# Patient Record
Sex: FEMALE | Race: WHITE | NOT HISPANIC OR LATINO | URBAN - METROPOLITAN AREA
[De-identification: names, ages, dates, MRNs, and addresses within clinical notes are randomized per-mention and may not be internally consistent; named-entity substitution may affect disease eponyms.]

---

## 2017-02-23 ENCOUNTER — IMPORTED ENCOUNTER (OUTPATIENT)
Dept: URBAN - METROPOLITAN AREA CLINIC 1 | Facility: CLINIC | Age: 82
End: 2017-02-23

## 2017-02-23 PROBLEM — H01.004: Noted: 2017-02-23

## 2017-02-23 PROBLEM — H01.001: Noted: 2017-02-23

## 2017-02-23 PROBLEM — H40.1132: Noted: 2017-02-23

## 2017-02-23 PROCEDURE — 92083 EXTENDED VISUAL FIELD XM: CPT

## 2017-02-23 PROCEDURE — 92012 INTRM OPH EXAM EST PATIENT: CPT

## 2017-02-23 NOTE — PATIENT DISCUSSION
1.  Moderate Open Angle Glaucoma OU (0.55/0.7)- VF shows no significant progression. Stable IOP OU. Patient to continue with Lumgian OU QHS. Consider adding gtt with any future progression. Continue brand Lumigan. Patient advised to be compliant with gtts. 2.  Blepharitis anterior type OU improved- Continue Daily warm compresses BID and lid scrubs QD were recommended. 3. ZEINAB w/ PEK OU- Cont ATs Q2hrs OU w/a 4. PCO OS: Observe 5. Pseudophakia OU- (Standard) 6. Amiodarone Keratopathy OU- Observe Return for an appointment in 6 mo 30 OCT with Dr. Hiram Lucas.

## 2017-08-31 ENCOUNTER — IMPORTED ENCOUNTER (OUTPATIENT)
Dept: URBAN - METROPOLITAN AREA CLINIC 1 | Facility: CLINIC | Age: 82
End: 2017-08-31

## 2017-08-31 PROBLEM — H40.1132: Noted: 2017-08-31

## 2017-08-31 PROBLEM — H26.492: Noted: 2017-08-31

## 2017-08-31 PROBLEM — H04.123: Noted: 2017-08-31

## 2017-08-31 PROBLEM — H01.005: Noted: 2017-08-31

## 2017-08-31 PROBLEM — Z96.1: Noted: 2017-08-31

## 2017-08-31 PROBLEM — H16.143: Noted: 2017-08-31

## 2017-08-31 PROBLEM — H01.002: Noted: 2017-08-31

## 2017-08-31 PROCEDURE — 92133 CPTRZD OPH DX IMG PST SGM ON: CPT

## 2017-08-31 PROCEDURE — 92014 COMPRE OPH EXAM EST PT 1/>: CPT

## 2017-08-31 NOTE — PATIENT DISCUSSION
1.  Moderate Open Angle Glaucoma OU (0.6/0.75)- Stable IOP OU. Increased C/D OU. Patient to continue with brnaded Lumigan OU QHS. Patient advised to be compliant with gtts. Condition was discussed with patient and patient understands. Will continue to monitor patient for any progression in condition. Patient was advised to call us with any problems questions or concerns. 2.  ZEINAB w/ PEK OU- Stable. The continuation of artificial tears were recommended. 3.  PCO  OS: Observe and consider yag cap when pt feels pco visually significant and visual acuity decreases to appropriate level. 4. Blepharitis anterior type OU- Stable. Daily warm compresses and lid scrubs were recommended. 5. Pseudophakia OU- Doing well. 6.  Patient defers the refraction at today's visit    7. Return for an appointment for a 10/HVF in 6 months with Dr. Lila Sanchez.

## 2018-03-05 ENCOUNTER — IMPORTED ENCOUNTER (OUTPATIENT)
Dept: URBAN - METROPOLITAN AREA CLINIC 1 | Facility: CLINIC | Age: 83
End: 2018-03-05

## 2018-03-05 PROBLEM — H40.1132: Noted: 2018-03-05

## 2018-03-05 PROCEDURE — 92012 INTRM OPH EXAM EST PATIENT: CPT

## 2018-03-05 PROCEDURE — 92083 EXTENDED VISUAL FIELD XM: CPT

## 2018-03-05 NOTE — PATIENT DISCUSSION
1.  Moderate Open Angle Glaucoma OU (0.6/0.75)- Stable IOP OU. VF shows no progression OU. Continue brand only Lumigan OU QHS. Patient advised to be compliant with gtts. 2.  ZEINAB w/ PEK OU- Cont ATs TID OU Routinely. 3.  PCO OS: Observe  4. Anterior Blepharitis OU OU- Cont Daily warm compresses and lid scrubs were recommended. 5. Pseudophakia OU Return for an appointment in 6 mo 27 OCT with Dr. Lucila Somers.

## 2018-09-10 ENCOUNTER — IMPORTED ENCOUNTER (OUTPATIENT)
Dept: URBAN - METROPOLITAN AREA CLINIC 1 | Facility: CLINIC | Age: 83
End: 2018-09-10

## 2018-09-10 PROBLEM — H40.1132: Noted: 2018-09-10

## 2018-09-10 PROBLEM — H04.123: Noted: 2018-09-10

## 2018-09-10 PROBLEM — H16.143: Noted: 2018-09-10

## 2018-09-10 PROCEDURE — 92133 CPTRZD OPH DX IMG PST SGM ON: CPT

## 2018-09-10 PROCEDURE — 92014 COMPRE OPH EXAM EST PT 1/>: CPT

## 2018-09-10 NOTE — PATIENT DISCUSSION
1.  Moderate Open Angle Glaucoma OU (0.6/0.75)- Stable IOP OU. OCT shows no progression OU. Continue brand only Lumigan OU QHS. Patient advised to be compliant with gtts. 2.  ZEINAB w/ PEK OU- Cont ATs TID OU Routinely. 3.  PCO OS: Observe  4. Anterior Blepharitis OU OU- Cont Daily warm compresses and lid scrubs were recommended. 5. Pseudophakia OU Letter to [address] for an appointment in 6 mo 10 VF 24-2 OU with Dr. Dimple Shukla.

## 2019-03-12 ENCOUNTER — IMPORTED ENCOUNTER (OUTPATIENT)
Dept: URBAN - METROPOLITAN AREA CLINIC 1 | Facility: CLINIC | Age: 84
End: 2019-03-12

## 2019-03-12 PROBLEM — H04.123: Noted: 2019-03-12

## 2019-03-12 PROBLEM — H26.493: Noted: 2019-03-12

## 2019-03-12 PROBLEM — H16.143: Noted: 2019-03-12

## 2019-03-12 PROBLEM — H40.1132: Noted: 2019-03-12

## 2019-03-12 PROCEDURE — 92083 EXTENDED VISUAL FIELD XM: CPT

## 2019-03-12 PROCEDURE — 92012 INTRM OPH EXAM EST PATIENT: CPT

## 2019-03-12 NOTE — PATIENT DISCUSSION
1.  Moderate Open Angle Glaucoma OU (CD: 0.70 / 0.75) -- IOP stable today at 13 OU. 24-2 HVF today shows WNL OU. Continue brand only Lumigan OU QHS. Patient to continue with current gtt regimen. Patient advised to be compliant with gtts. Condition was discussed with patient and patient understands. Will continue to monitor patient for any progression in condition. Patient was advised to call us with any problems questions or concerns. 2.  ZEINAB w/ PEK OU -- Non-Compliant. Stressed Compliance. Recommend increase the frequent use of OTC AT's TID-QID OU Routinely. 3. PCO OU (Posterior Capsule Opacification) -- Observe and consider YAG Cap when patient feels PCO visually significant and visual acuity decreases to appropriate level. 4. Anterior Blepharitis OU OU- Cont Daily warm compresses and lid scrubs were recommended. 5. Pseudophakia OUReturn for an appointment in 10 MO for a 27 / Charlene Yusef OU with Dr. Mary Rodriguez.

## 2019-03-12 NOTE — PATIENT DISCUSSION
ZEINAB w/ PEK OU -- Non-Compliant. Stressed Compliance. Recommend increase the frequent use of OTC AT's TID-QID OU Routinely.

## 2019-10-22 ENCOUNTER — IMPORTED ENCOUNTER (OUTPATIENT)
Dept: URBAN - METROPOLITAN AREA CLINIC 1 | Facility: CLINIC | Age: 84
End: 2019-10-22

## 2019-10-22 PROBLEM — H04.123: Noted: 2019-10-22

## 2019-10-22 PROBLEM — H40.1132: Noted: 2019-10-22

## 2019-10-22 PROBLEM — H16.143: Noted: 2019-10-22

## 2019-10-22 PROCEDURE — 92014 COMPRE OPH EXAM EST PT 1/>: CPT

## 2019-10-22 PROCEDURE — 92133 CPTRZD OPH DX IMG PST SGM ON: CPT

## 2019-10-22 NOTE — PATIENT DISCUSSION
1.  Moderate Open Angle Glaucoma OU (CD: 0.70 / 0.75) -- OCT shows no progression OU IOP Stable on Lumigan. Cont Lumigan QHS OU. Refill sent to pharmacy today. 2.  ZEINAB w/ PEK OU -- Cont ATs TID OU Routinely. 3.  PCO OU (Posterior Capsule Opacification) -- Observe 4. Anterior Blepharitis OU OU- Cont Daily warm compresses and lid scrubs were recommended. 5. Pseudophakia OU6. Macula Drusen OU- observe. Letter to PCP MRx deferredReturn for an appointment in 6 mo 10 Vf 24-2 OU with Dr. Jose F Renner.

## 2020-03-05 ENCOUNTER — IMPORTED ENCOUNTER (OUTPATIENT)
Dept: URBAN - METROPOLITAN AREA CLINIC 1 | Facility: CLINIC | Age: 85
End: 2020-03-05

## 2020-03-05 PROBLEM — H04.123: Noted: 2020-03-05

## 2020-03-05 PROBLEM — H16.143: Noted: 2020-03-05

## 2020-03-05 PROCEDURE — 92012 INTRM OPH EXAM EST PATIENT: CPT

## 2020-03-05 NOTE — PATIENT DISCUSSION
Moderate Open Angle Glaucoma OU (CD: 0.70 / 0.75) --Cont Lumigan QHS OU. Refill sent to pharmacy today. 3.  PCO OU (Posterior Capsule Opacification) -- Observe 4. Anterior Blepharitis OU OU- Cont Daily warm compresses and lid scrubs were recommended. 5. Pseudophakia OU6.   Macula Drusen OU

## 2020-03-05 NOTE — PATIENT DISCUSSION
1.  ZEINAB w/ PEK OU- Recommend switch to PF ATs QID-2H OU (sample of Art 3 Refresh given) routinely and AT Gel QHS OU 2. Moderate Open Angle Glaucoma OU (CD: 0.70/0.75) - Cont Lumigan QHS OU. Refill sent to pharmacy today. 3.  PCO OU (Posterior Capsule Opacification) - Observe 4. Anterior Blepharitis OU OU - Cont Daily warm compresses and lid scrubs were recommended. 5. Pseudophakia OU - Doing Well 6. H/o Macula Drusen OUReturn for an appointment for Return as scheduled 10/HVF with Dr. Michoacano Lara.

## 2020-05-29 NOTE — PATIENT DISCUSSION
"""Based on increased c/d ratio OU. IOP stable without drop therapy at this time.  Followed by Dr. Brijesh Abraham

## 2020-05-29 NOTE — PATIENT DISCUSSION
"""Sebaceous cyst bothersome to patient.  Patient to consult with Dr. Cecilia Freitas after cataract ""

## 2020-07-09 NOTE — PATIENT DISCUSSION
"""S/P IOL OD: Tecnis MF ZKB00 25.5 ([facility]) (Target: [place]) +ORA/Femto/Arcs +Omidria. Continue post operative instructions and drops per schedule.  """

## 2020-07-14 NOTE — PATIENT DISCUSSION
"""Phaco with IOL OS: 07/16/2020 Doris CARROLL ZKB00 26.0 Target: [facility]

## 2020-07-14 NOTE — PATIENT DISCUSSION
"""S/P IOL OD: Tecnis MF ZKB00 25.5 ([facility]) (Target: [place]) +ORA/Femto/Arcs +Omidria. Continue post operative instructions and drops per schedule.  """

## 2020-07-16 NOTE — PATIENT DISCUSSION
"""S/P IOL OS: Tecnis MF ZKB00 26.0 (Target: [place]) +Femto/Arcs +Omidria. Continue post operative instructions and drops per schedule.  """

## 2020-07-22 NOTE — PATIENT DISCUSSION
"""S/P IOL OS: Tecnis MF ZKB00 26.0 (Target: [place]) +Femto/Arcs +Omidria. Continue post operative instructions and drops per schedule.  """

## 2020-07-27 ENCOUNTER — IMPORTED ENCOUNTER (OUTPATIENT)
Dept: URBAN - METROPOLITAN AREA CLINIC 1 | Facility: CLINIC | Age: 85
End: 2020-07-27

## 2020-07-27 PROBLEM — H01.004: Noted: 2020-07-27

## 2020-07-27 PROBLEM — H40.1132: Noted: 2020-07-27

## 2020-07-27 PROBLEM — H01.005: Noted: 2020-07-27

## 2020-07-27 PROBLEM — H01.001: Noted: 2020-07-27

## 2020-07-27 PROBLEM — H01.002: Noted: 2020-07-27

## 2020-07-27 PROBLEM — H04.123: Noted: 2020-07-27

## 2020-07-27 PROBLEM — H16.143: Noted: 2020-07-27

## 2020-07-27 PROCEDURE — 92083 EXTENDED VISUAL FIELD XM: CPT

## 2020-07-27 PROCEDURE — 92012 INTRM OPH EXAM EST PATIENT: CPT

## 2020-07-27 NOTE — PATIENT DISCUSSION
1.  Moderate Open Angle Glaucoma OU (CD: 0.70/0.75) - HVF today showing defects stable OU. Cont Lumigan QHS OU. Refill sent to pharmacy today. 2.  ZEINAB w/ PEK OU- Recommend switch to PF ATs QID-2H OU (sample of Art 3 Refresh given) routinely and AT Gel QHS OU 3. PCO OU (Posterior Capsule Opacification) - Observe 4. Anterior Blepharitis OU OU - Cont Daily warm compresses and lid scrubs were recommended. 5. Pseudophakia OU - Doing Well 6. H/o Macula Drusen OUReturn for an appointment 6 months for a 30/OCT Dr. Socorro Pickett.

## 2020-08-11 ENCOUNTER — IMPORTED ENCOUNTER (OUTPATIENT)
Dept: URBAN - METROPOLITAN AREA CLINIC 1 | Facility: CLINIC | Age: 85
End: 2020-08-11

## 2020-08-11 PROBLEM — H11.32: Noted: 2020-08-11

## 2020-08-11 PROBLEM — S00.12XA: Noted: 2020-08-11

## 2020-08-11 PROCEDURE — 92012 INTRM OPH EXAM EST PATIENT: CPT

## 2020-08-11 NOTE — PATIENT DISCUSSION
1. Contusion periocular OS w/ Subconjunctival hemorrhage OS -- Secondary to blunt force trauma. No sign of orbital fracture or blowout. Reassurance given. Condition discussed with patient. 2.  Moderate Open Angle Glaucoma OU (CD: 0.70/0.75) - HVF today showing defects stable OU. Cont Lumigan QHS OU. Refill sent to pharmacy today. 3.  ZEINAB w/ PEK OU- Recommend switch to PF ATs QID-2H OU (sample of Art 3 Refresh given) routinely and AT Gel QHS OU 4. PCO OU (Posterior Capsule Opacification) - Observe 5. Anterior Blepharitis OU OU - Cont Daily warm compresses and lid scrubs were recommended. 6. Pseudophakia OU - Doing Well 7. H/o Macula Drusen OUReturn for an appointment as scheduled February Dr. Dimple Shukla.

## 2020-08-26 NOTE — PATIENT DISCUSSION
"""S/P IOL OU: OD: Doris CARROLL ZKB00 25.5 ([facility]) (Target: [place]) +ORAFemto/ArcsOmidria. OS: Doris CARROLL ZKB00 26.0 (Target: [place])/Arcs. "

## 2021-02-01 ENCOUNTER — IMPORTED ENCOUNTER (OUTPATIENT)
Dept: URBAN - METROPOLITAN AREA CLINIC 1 | Facility: CLINIC | Age: 86
End: 2021-02-01

## 2021-02-01 PROBLEM — H35.363: Noted: 2021-02-01

## 2021-02-01 PROBLEM — H01.004: Noted: 2021-02-01

## 2021-02-01 PROBLEM — Z96.1: Noted: 2021-02-01

## 2021-02-01 PROBLEM — H40.1132: Noted: 2021-02-01

## 2021-02-01 PROBLEM — H01.001: Noted: 2021-02-01

## 2021-02-01 PROBLEM — H04.123: Noted: 2021-02-01

## 2021-02-01 PROBLEM — H26.493: Noted: 2021-02-01

## 2021-02-01 PROBLEM — H16.143: Noted: 2021-02-01

## 2021-02-01 PROCEDURE — 92014 COMPRE OPH EXAM EST PT 1/>: CPT

## 2021-02-01 PROCEDURE — 92133 CPTRZD OPH DX IMG PST SGM ON: CPT

## 2021-02-01 NOTE — PATIENT DISCUSSION
1.  Moderate Open Angle Glaucoma OU (CD 0.70/0.75) - No progression by OCT. IOP slightly elevated will switch to Latanoprost QHS OU (erx). Patient advised to be compliant with gtts. Condition was discussed with patient and patient understands. Will continue to monitor patient for any progression in condition. Patient was advised to call us with any problems questions or concerns. 2.  ZEINAB w/ PEK OU- Recommend PF ATs QID-2H OU routinely and AT Gel QHS OU 3. PCO OU: (Posterior Capsule Opacification)   Observe and consider yag cap when pt feels pco visually significant and visual acuity decreases to appropriate level. 4. Pseudophakia OU - Doing Well 5. Anterior Blepharitis OU - Daily Hot compresses and lid scrubs were recommended. 6. Macular Drusen OU- Stable. ObserveReturn for an appointment in 6 months 10/HVF with Dr. Rossy Hilario.

## 2021-08-06 ENCOUNTER — IMPORTED ENCOUNTER (OUTPATIENT)
Dept: URBAN - METROPOLITAN AREA CLINIC 1 | Facility: CLINIC | Age: 86
End: 2021-08-06

## 2021-08-06 PROBLEM — H40.1132: Noted: 2021-08-06

## 2021-08-06 PROCEDURE — 92012 INTRM OPH EXAM EST PATIENT: CPT

## 2021-08-06 PROCEDURE — 92083 EXTENDED VISUAL FIELD XM: CPT

## 2021-08-06 NOTE — PATIENT DISCUSSION
1.  Moderate Open Angle Glaucoma OU (CD 0.70/0.75) -- HVF today showing superior and inferior defects VS Artifact OU. IOP stable. Will switch from Latanoprost to brand Lumigan per patients request (patient prefers brand) Cont Lumigan QHS OU. ***Consider adding gtt with any future progression. Patient advised to be compliant with gtts. Condition was discussed with patient and patient understands. Will continue to monitor patient for any progression in condition. Patient was advised to call us with any problems questions or concerns. 2.  ZEINAB w/ PEK OU -- Recommend PF ATs QID-2H OU routinely and AT Gel QHS OU 3. PCO OU -- (Posterior Capsule Opacification)   Observe and consider yag cap when pt feels pco visually significant and visual acuity decreases to appropriate level. 4. Pseudophakia OU -- Doing Well 5. Anterior Blepharitis OU -- Daily Hot compresses and lid scrubs were recommended. 6. H/o Macular Drusen OU Return for an appointment in 6 months 30/OCT/glare with Dr. Ivon Dobbins.

## 2022-01-06 NOTE — PATIENT DISCUSSION
Patient states the bright lights at night do not bother her. Night time driving bothers her secondary to it being so dark.

## 2022-02-17 ENCOUNTER — COMPREHENSIVE EXAM (OUTPATIENT)
Dept: URBAN - METROPOLITAN AREA CLINIC 1 | Facility: CLINIC | Age: 87
End: 2022-02-17

## 2022-02-17 DIAGNOSIS — H26.493: ICD-10-CM

## 2022-02-17 DIAGNOSIS — H01.021: ICD-10-CM

## 2022-02-17 DIAGNOSIS — Z96.1: ICD-10-CM

## 2022-02-17 DIAGNOSIS — H04.123: ICD-10-CM

## 2022-02-17 DIAGNOSIS — H35.363: ICD-10-CM

## 2022-02-17 DIAGNOSIS — H01.024: ICD-10-CM

## 2022-02-17 DIAGNOSIS — H40.1132: ICD-10-CM

## 2022-02-17 PROCEDURE — 92015 DETERMINE REFRACTIVE STATE: CPT

## 2022-02-17 PROCEDURE — 92133 CPTRZD OPH DX IMG PST SGM ON: CPT

## 2022-02-17 PROCEDURE — 99214 OFFICE O/P EST MOD 30 MIN: CPT

## 2022-02-17 ASSESSMENT — TONOMETRY
OS_IOP_MMHG: 15
OD_IOP_MMHG: 15

## 2022-02-17 ASSESSMENT — VISUAL ACUITY
OD_SC: 20/40
OS_SC: 20/30

## 2022-02-17 NOTE — PATIENT DISCUSSION
(CD 0.75 OU) OCT shows no significant change OU. IOP stable. Continue Latanoprost QHS OU. Patient to continue with current gtt regimen. Patient advised to be compliant with gtts. Condition was discussed with patient and patient understands. Will continue to monitor patient for any progression in condition. Patient was advised to call us with any problems, questions, or concerns.

## 2022-04-02 ASSESSMENT — VISUAL ACUITY
OD_CC: 20/40
OD_CC: 20/60
OS_SC: 20/25-1
OS_SC: 20/25
OD_CC: 20/40
OS_SC: 20/20
OD_SC: 20/30
OS_SC: 20/20
OS_GLARE: 20/80
OS_SC: 20/20-2
OD_SC: 20/20
OS_CC: 20/50
OD_SC: 20/25
OS_SC: 20/30
OD_SC: 20/20-1
OS_CC: 20/40
OD_SC: 20/20-2
OS_CC: 20/40
OS_SC: 20/20
OD_GLARE: 20/80
OS_SC: 20/20
OD_SC: 20/20-2
OD_SC: 20/20
OS_SC: 20/20
OS_SC: 20/20-2
OS_SC: 20/20
OD_SC: 20/40
OD_SC: 20/20-2
OD_SC: 20/20

## 2022-04-02 ASSESSMENT — TONOMETRY
OD_IOP_MMHG: 13
OS_IOP_MMHG: 13
OS_IOP_MMHG: 21
OD_IOP_MMHG: 16
OD_IOP_MMHG: 21
OS_IOP_MMHG: 14
OS_IOP_MMHG: 15
OD_IOP_MMHG: 16
OD_IOP_MMHG: 13
OD_IOP_MMHG: 13
OD_IOP_MMHG: 18
OS_IOP_MMHG: 18
OD_IOP_MMHG: 13
OS_IOP_MMHG: 19
OD_IOP_MMHG: 14
OS_IOP_MMHG: 15
OS_IOP_MMHG: 12
OS_IOP_MMHG: 13
OD_IOP_MMHG: 15
OS_IOP_MMHG: 13
OS_IOP_MMHG: 13
OD_IOP_MMHG: 13

## 2023-01-03 ENCOUNTER — FOLLOW UP (OUTPATIENT)
Dept: URBAN - METROPOLITAN AREA CLINIC 1 | Facility: CLINIC | Age: 88
End: 2023-01-03

## 2023-01-03 DIAGNOSIS — H40.1132: ICD-10-CM

## 2023-01-03 PROCEDURE — 99213 OFFICE O/P EST LOW 20 MIN: CPT

## 2023-01-03 PROCEDURE — 92083 EXTENDED VISUAL FIELD XM: CPT

## 2023-01-03 ASSESSMENT — TONOMETRY
OS_IOP_MMHG: 15
OD_IOP_MMHG: 15

## 2023-01-03 ASSESSMENT — VISUAL ACUITY
OD_CC: 20/20
OS_CC: 20/25

## 2023-01-03 NOTE — PATIENT DISCUSSION
(CD 0.75 OU)HVF today with no progression OU. IOP stable. Continue Latanoprost QHS OU. Patient to continue with current gtt regimen. Patient advised to be compliant with gtts. Condition was discussed with patient and patient understands. Will continue to monitor patient for any progression in condition. Patient was advised to call us with any problems, questions, or concerns.